# Patient Record
Sex: FEMALE | Race: WHITE | URBAN - METROPOLITAN AREA
[De-identification: names, ages, dates, MRNs, and addresses within clinical notes are randomized per-mention and may not be internally consistent; named-entity substitution may affect disease eponyms.]

---

## 2021-07-19 ENCOUNTER — OFFICE VISIT (OUTPATIENT)
Dept: URBAN - METROPOLITAN AREA CLINIC 64 | Facility: CLINIC | Age: 76
End: 2021-07-19
Payer: MEDICARE

## 2021-07-19 DIAGNOSIS — H25.813 COMBINED FORMS OF AGE-RELATED CATARACT, BILATERAL: Primary | ICD-10-CM

## 2021-07-19 DIAGNOSIS — H52.213 IRREGULAR ASTIGMATISM, BILATERAL: ICD-10-CM

## 2021-07-19 DIAGNOSIS — H25.811 COMBINED FORMS OF AGE-RELATED CATARACT, RIGHT EYE: ICD-10-CM

## 2021-07-19 PROCEDURE — 92136 OPHTHALMIC BIOMETRY: CPT | Performed by: OPHTHALMOLOGY

## 2021-07-19 PROCEDURE — 99204 OFFICE O/P NEW MOD 45 MIN: CPT | Performed by: OPHTHALMOLOGY

## 2021-07-19 ASSESSMENT — INTRAOCULAR PRESSURE
OD: 16
OS: 13

## 2021-07-19 ASSESSMENT — VISUAL ACUITY
OS: 20/100
OD: 20/50

## 2021-07-19 ASSESSMENT — KERATOMETRY
OS: 57.99
OD: 49.79

## 2021-07-19 NOTE — IMPRESSION/PLAN
Impression: Keratoconus, unspecified, bilateral, OU Plan: Discussed diagnosis in detail with patient. Will continue to observe condition and or symptoms. Recommend repeat Ascan, pentacam and autorefract at H&P appt. Pentacam K's may be more accurate than Lenstar K's. Compare to MRx.

## 2021-07-19 NOTE — IMPRESSION/PLAN
Impression: Combined forms of age-related cataract, bilateral: H25.813. Plan: Discussed cataracts, treatment options, and surgical risks/benefits with patient. Patient elects surgical treatment. Recommend surgery OU, OS first. Aim OD: [place]. Aim OS: -0.25. Strongly recommend ORA due to keratoconus, difficult testing and measurements. Recommend monofocal IOL due to keratoconus OU. 
Recommend Moxifloxacin (PF) Intracameral Injection and Dextenza intracanalicular insert in punctum OD only (amended by  for DLM [id number])

## 2021-07-19 NOTE — IMPRESSION/PLAN
Impression: Irregular astigmatism, bilateral: H52.213. Plan: Keratoconus OU. AM not recommended at time of cataract surgery. Pt aware she will need to wear glasses or RGP CLs after cataract surgery to correct astigmatism.

## 2021-07-22 ENCOUNTER — ADULT PHYSICAL (OUTPATIENT)
Dept: URBAN - METROPOLITAN AREA CLINIC 64 | Facility: CLINIC | Age: 76
End: 2021-07-22
Payer: MEDICARE

## 2021-07-22 DIAGNOSIS — Z01.818 ENCOUNTER FOR OTHER PREPROCEDURAL EXAMINATION: Primary | ICD-10-CM

## 2021-07-22 PROCEDURE — 99203 OFFICE O/P NEW LOW 30 MIN: CPT | Performed by: NURSE PRACTITIONER

## 2021-08-03 ENCOUNTER — SURGERY (OUTPATIENT)
Dept: URBAN - METROPOLITAN AREA SURGERY 42 | Facility: SURGERY | Age: 76
End: 2021-08-03
Payer: MEDICARE

## 2021-08-03 PROCEDURE — 66984 XCAPSL CTRC RMVL W/O ECP: CPT | Performed by: OPHTHALMOLOGY

## 2021-08-03 RX ORDER — OFLOXACIN 3 MG/ML
0.3 % SOLUTION/ DROPS OPHTHALMIC
Qty: 1 | Refills: 1 | Status: INACTIVE
Start: 2021-08-03 | End: 2021-08-10

## 2021-08-03 RX ORDER — PREDNISOLONE ACETATE 10 MG/ML
1 % SUSPENSION/ DROPS OPHTHALMIC
Qty: 1 | Refills: 3 | Status: INACTIVE
Start: 2021-08-03 | End: 2021-08-31

## 2021-08-03 RX ORDER — DICLOFENAC SODIUM 1 MG/ML
0.1 % SOLUTION/ DROPS OPHTHALMIC
Qty: 1 | Refills: 2 | Status: INACTIVE
Start: 2021-08-03 | End: 2021-08-17

## 2021-08-04 ENCOUNTER — POST-OPERATIVE VISIT (OUTPATIENT)
Dept: URBAN - METROPOLITAN AREA CLINIC 64 | Facility: CLINIC | Age: 76
End: 2021-08-04

## 2021-08-04 DIAGNOSIS — Z48.810 ENCOUNTER FOR SURGICAL AFTERCARE FOLLOWING SURGERY ON A SENSE ORGAN: Primary | ICD-10-CM

## 2021-08-04 PROCEDURE — 99024 POSTOP FOLLOW-UP VISIT: CPT | Performed by: OPTOMETRIST

## 2021-08-04 ASSESSMENT — INTRAOCULAR PRESSURE: OS: 15

## 2021-08-04 NOTE — IMPRESSION/PLAN
Impression: S/P Cataract Extraction by phacoemulsification with IOL placement/ORA OS - 1 Day. Encounter for surgical aftercare following surgery on a sense organ  Z48.810. Plan: S/P PE with IOL OS - mild central corneal scar - hx of keratoconus OU - last wore RGPs 30 yrs ago. Healing well. RTC 1 wk.

## 2021-08-10 ENCOUNTER — POST-OPERATIVE VISIT (OUTPATIENT)
Dept: URBAN - METROPOLITAN AREA CLINIC 64 | Facility: CLINIC | Age: 76
End: 2021-08-10
Payer: MEDICARE

## 2021-08-10 ENCOUNTER — SURGERY (OUTPATIENT)
Dept: URBAN - METROPOLITAN AREA SURGERY 42 | Facility: SURGERY | Age: 76
End: 2021-08-10
Payer: MEDICARE

## 2021-08-10 PROCEDURE — 66984 XCAPSL CTRC RMVL W/O ECP: CPT | Performed by: OPHTHALMOLOGY

## 2021-08-10 PROCEDURE — 99024 POSTOP FOLLOW-UP VISIT: CPT | Performed by: OPTOMETRIST

## 2021-08-10 ASSESSMENT — INTRAOCULAR PRESSURE
OD: 17
OS: 17
OS: 17
OD: 17

## 2021-08-10 ASSESSMENT — VISUAL ACUITY: OD: 20/50

## 2021-08-10 NOTE — IMPRESSION/PLAN
Impression: S/P Cataract Extraction by phacoemulsification with IOL placement/ORA OS - 7 Days. Encounter for surgical aftercare following surgery on a sense organ  Z48.810.  Excellent post op course Plan:

## 2021-08-11 ENCOUNTER — POST-OPERATIVE VISIT (OUTPATIENT)
Dept: URBAN - METROPOLITAN AREA CLINIC 64 | Facility: CLINIC | Age: 76
End: 2021-08-11
Payer: MEDICARE

## 2021-08-11 PROCEDURE — 99024 POSTOP FOLLOW-UP VISIT: CPT | Performed by: OPTOMETRIST

## 2021-08-11 ASSESSMENT — INTRAOCULAR PRESSURE: OD: 30

## 2021-08-11 NOTE — IMPRESSION/PLAN
Impression: S/P Cataract Extraction by phacoemulsification with IOL placement/ORA OD - 1 Day. Encounter for surgical aftercare following surgery on a sense organ  Z48.810. Plan: Pressure is high today flushed. patient to return 1 week for recheck.

## 2021-08-13 ENCOUNTER — POST-OPERATIVE VISIT (OUTPATIENT)
Dept: URBAN - METROPOLITAN AREA CLINIC 64 | Facility: CLINIC | Age: 76
End: 2021-08-13
Payer: MEDICARE

## 2021-08-13 PROCEDURE — 99024 POSTOP FOLLOW-UP VISIT: CPT | Performed by: OPTOMETRIST

## 2021-08-13 ASSESSMENT — INTRAOCULAR PRESSURE: OD: 15

## 2021-08-13 NOTE — IMPRESSION/PLAN
Impression: S/P Cataract Extraction by phacoemulsification with IOL placement/ORA OD - 3 Days. Encounter for surgical aftercare following surgery on a sense organ  Z48.720. Plan: Patient was in today for reinsurance. Eyes look good today. IOP is good. recheck on Tuesday.

## 2021-08-17 ENCOUNTER — POST-OPERATIVE VISIT (OUTPATIENT)
Dept: URBAN - METROPOLITAN AREA CLINIC 64 | Facility: CLINIC | Age: 76
End: 2021-08-17

## 2021-08-17 DIAGNOSIS — Z96.1 PRESENCE OF INTRAOCULAR LENS: Primary | ICD-10-CM

## 2021-08-17 PROCEDURE — 99024 POSTOP FOLLOW-UP VISIT: CPT | Performed by: OPTOMETRIST

## 2021-08-17 ASSESSMENT — VISUAL ACUITY: OD: 20/20

## 2021-08-17 ASSESSMENT — INTRAOCULAR PRESSURE
OD: 7
OS: 8

## 2021-08-17 NOTE — IMPRESSION/PLAN
Impression: S/P Cataract Extraction by phacoemulsification with IOL placement/ORA OD - 7 Days. Presence of intraocular lens  Z96.1. Plan: Pt has double vision OD only, possibly due to astigmatism. Rx glasses at next PO.

## 2021-08-31 ENCOUNTER — POST-OPERATIVE VISIT (OUTPATIENT)
Dept: URBAN - METROPOLITAN AREA CLINIC 64 | Facility: CLINIC | Age: 76
End: 2021-08-31
Payer: MEDICARE

## 2021-08-31 PROCEDURE — 99024 POSTOP FOLLOW-UP VISIT: CPT | Performed by: OPTOMETRIST

## 2021-08-31 ASSESSMENT — INTRAOCULAR PRESSURE
OD: 9
OS: 7

## 2021-08-31 ASSESSMENT — VISUAL ACUITY
OS: 20/200
OD: 20/25

## 2021-08-31 NOTE — IMPRESSION/PLAN
Impression: S/P Cataract Extraction by phacoemulsification with IOL placement/ORA OD - 21 Days. Presence of intraocular lens  Z96.1. Plan: RTC 3 months for follow up. Glasses Rx given today.

## 2021-11-30 ENCOUNTER — OFFICE VISIT (OUTPATIENT)
Dept: URBAN - METROPOLITAN AREA CLINIC 64 | Facility: CLINIC | Age: 76
End: 2021-11-30
Payer: MEDICARE

## 2021-11-30 DIAGNOSIS — H01.029 SQUAMOUS BLEPHARITIS UNSPECIFIED EYE, UNSPECIFIED EYELID: Primary | ICD-10-CM

## 2021-11-30 DIAGNOSIS — H18.603 KERATOCONUS, UNSPECIFIED, BILATERAL: ICD-10-CM

## 2021-11-30 DIAGNOSIS — H26.492 OTHER SECONDARY CATARACT, LEFT EYE: ICD-10-CM

## 2021-11-30 PROCEDURE — 99212 OFFICE O/P EST SF 10 MIN: CPT | Performed by: OPTOMETRIST

## 2021-11-30 ASSESSMENT — KERATOMETRY
OD: 50.73
OS: 56.87

## 2021-11-30 ASSESSMENT — INTRAOCULAR PRESSURE
OD: 15
OS: 11

## 2021-11-30 ASSESSMENT — VISUAL ACUITY: OD: 20/25

## 2021-11-30 NOTE — IMPRESSION/PLAN
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 Plan: OU. Recommend lid scrubs daily.

## 2021-11-30 NOTE — IMPRESSION/PLAN
Impression: Other secondary cataract, left eye: H26.492. Plan: Mild. No treatment required. Monitor.

## 2021-11-30 NOTE — IMPRESSION/PLAN
Impression: Keratoconus, unspecified, bilateral, OU Plan: Discussed diagnosis in detail with patient. Stable. Continue to monitor.

## 2022-07-26 ENCOUNTER — OFFICE VISIT (OUTPATIENT)
Dept: URBAN - METROPOLITAN AREA CLINIC 64 | Facility: CLINIC | Age: 77
End: 2022-07-26
Payer: MEDICARE

## 2022-07-26 DIAGNOSIS — H52.4 PRESBYOPIA: ICD-10-CM

## 2022-07-26 DIAGNOSIS — H18.603 KERATOCONUS, UNSPECIFIED, BILATERAL: Primary | ICD-10-CM

## 2022-07-26 DIAGNOSIS — H26.492 OTHER SECONDARY CATARACT, LEFT EYE: ICD-10-CM

## 2022-07-26 PROCEDURE — 92014 COMPRE OPH EXAM EST PT 1/>: CPT | Performed by: OPTOMETRIST

## 2022-07-26 ASSESSMENT — VISUAL ACUITY: OD: 20/30

## 2022-07-26 ASSESSMENT — INTRAOCULAR PRESSURE
OD: 13
OS: 12

## 2022-07-26 ASSESSMENT — KERATOMETRY
OS: 57.22
OD: 50.65

## 2022-07-26 NOTE — IMPRESSION/PLAN
Impression: Keratoconus, unspecified, bilateral, OU Plan: Discussed diagnosis in detail with patient. Stable. Monitor.

## 2023-07-26 ENCOUNTER — OFFICE VISIT (OUTPATIENT)
Dept: URBAN - METROPOLITAN AREA CLINIC 64 | Facility: LOCATION | Age: 78
End: 2023-07-26
Payer: MEDICARE

## 2023-07-26 DIAGNOSIS — H52.4 PRESBYOPIA: ICD-10-CM

## 2023-07-26 DIAGNOSIS — H26.492 OTHER SECONDARY CATARACT, LEFT EYE: ICD-10-CM

## 2023-07-26 DIAGNOSIS — H18.603 KERATOCONUS, UNSPECIFIED, BILATERAL: Primary | ICD-10-CM

## 2023-07-26 PROCEDURE — 99214 OFFICE O/P EST MOD 30 MIN: CPT | Performed by: OPTOMETRIST

## 2023-07-26 ASSESSMENT — VISUAL ACUITY
OD: 20/25
OS: 20/300

## 2023-07-26 ASSESSMENT — KERATOMETRY: OD: 49.91

## 2023-07-26 ASSESSMENT — INTRAOCULAR PRESSURE
OS: 9
OD: 9

## 2024-07-29 ENCOUNTER — OFFICE VISIT (OUTPATIENT)
Dept: URBAN - METROPOLITAN AREA CLINIC 64 | Facility: LOCATION | Age: 79
End: 2024-07-29
Payer: MEDICARE

## 2024-07-29 DIAGNOSIS — H53.002 UNSPECIFIED AMBLYOPIA, LEFT EYE: ICD-10-CM

## 2024-07-29 DIAGNOSIS — H18.603 KERATOCONUS, UNSPECIFIED, BILATERAL: Primary | ICD-10-CM

## 2024-07-29 DIAGNOSIS — H26.492 OTHER SECONDARY CATARACT, LEFT EYE: ICD-10-CM

## 2024-07-29 PROCEDURE — 99214 OFFICE O/P EST MOD 30 MIN: CPT | Performed by: OPTOMETRIST

## 2024-07-29 ASSESSMENT — KERATOMETRY
OS: 56.83
OD: 50.09

## 2024-07-29 ASSESSMENT — INTRAOCULAR PRESSURE
OD: 18
OS: 21

## 2025-07-29 ENCOUNTER — OFFICE VISIT (OUTPATIENT)
Dept: URBAN - METROPOLITAN AREA CLINIC 64 | Facility: LOCATION | Age: 80
End: 2025-07-29
Payer: MEDICARE

## 2025-07-29 DIAGNOSIS — H26.492 OTHER SECONDARY CATARACT, LEFT EYE: ICD-10-CM

## 2025-07-29 DIAGNOSIS — B88.0 DERMATITIS DUE TO DEMODEX SPECIES: ICD-10-CM

## 2025-07-29 DIAGNOSIS — H18.603 KERATOCONUS, UNSPECIFIED, BILATERAL: ICD-10-CM

## 2025-07-29 DIAGNOSIS — H53.002 UNSPECIFIED AMBLYOPIA, LEFT EYE: ICD-10-CM

## 2025-07-29 DIAGNOSIS — H52.4 PRESBYOPIA: ICD-10-CM

## 2025-07-29 DIAGNOSIS — H43.393 OTHER VITREOUS OPACITIES, BILATERAL: Primary | ICD-10-CM

## 2025-07-29 PROCEDURE — 99214 OFFICE O/P EST MOD 30 MIN: CPT | Performed by: OPTOMETRIST

## 2025-07-29 RX ORDER — LOTILANER OPHTHALMIC SOLUTION 2.5 MG/ML
0.25 % SOLUTION/ DROPS OPHTHALMIC
Qty: 10 | Refills: 0 | Status: INACTIVE
Start: 2025-07-29 | End: 2025-09-08

## 2025-07-29 ASSESSMENT — VISUAL ACUITY
OS: 20/80
OD: 20/30

## 2025-07-29 ASSESSMENT — INTRAOCULAR PRESSURE
OD: 15
OS: 13

## 2025-08-11 ENCOUNTER — OFFICE VISIT (OUTPATIENT)
Dept: URBAN - METROPOLITAN AREA CLINIC 64 | Facility: LOCATION | Age: 80
End: 2025-08-11
Payer: MEDICARE

## 2025-08-11 DIAGNOSIS — H52.4 PRESBYOPIA: Primary | ICD-10-CM

## 2025-08-11 PROCEDURE — 92015 DETERMINE REFRACTIVE STATE: CPT | Performed by: OPTOMETRIST

## 2025-08-11 ASSESSMENT — VISUAL ACUITY
OD: 20/20
OS: 20/20